# Patient Record
Sex: FEMALE | Race: WHITE | HISPANIC OR LATINO | Employment: FULL TIME | URBAN - METROPOLITAN AREA
[De-identification: names, ages, dates, MRNs, and addresses within clinical notes are randomized per-mention and may not be internally consistent; named-entity substitution may affect disease eponyms.]

---

## 2017-10-04 ENCOUNTER — COMMUNICATION - HEALTHEAST (OUTPATIENT)
Dept: TELEHEALTH | Facility: CLINIC | Age: 32
End: 2017-10-04

## 2017-10-04 ENCOUNTER — OFFICE VISIT - HEALTHEAST (OUTPATIENT)
Dept: FAMILY MEDICINE | Facility: CLINIC | Age: 32
End: 2017-10-04

## 2017-10-04 DIAGNOSIS — Z23 NEED FOR VACCINATION: ICD-10-CM

## 2017-10-04 DIAGNOSIS — Z00.00 ANNUAL PHYSICAL EXAM: ICD-10-CM

## 2017-10-04 DIAGNOSIS — E66.3 OVERWEIGHT (BMI 25.0-29.9): ICD-10-CM

## 2017-10-04 DIAGNOSIS — Z76.89 ESTABLISHING CARE WITH NEW DOCTOR, ENCOUNTER FOR: ICD-10-CM

## 2017-10-04 LAB
CHOLEST SERPL-MCNC: 202 MG/DL
FASTING STATUS PATIENT QL REPORTED: YES
HDLC SERPL-MCNC: 53 MG/DL
LDLC SERPL CALC-MCNC: 118 MG/DL
TRIGL SERPL-MCNC: 157 MG/DL

## 2017-10-04 ASSESSMENT — MIFFLIN-ST. JEOR: SCORE: 1387.61

## 2018-01-05 ENCOUNTER — COMMUNICATION - HEALTHEAST (OUTPATIENT)
Dept: FAMILY MEDICINE | Facility: CLINIC | Age: 33
End: 2018-01-05

## 2018-02-13 ENCOUNTER — AMBULATORY - HEALTHEAST (OUTPATIENT)
Dept: FAMILY MEDICINE | Facility: CLINIC | Age: 33
End: 2018-02-13

## 2018-02-13 ENCOUNTER — COMMUNICATION - HEALTHEAST (OUTPATIENT)
Dept: FAMILY MEDICINE | Facility: CLINIC | Age: 33
End: 2018-02-13

## 2018-02-13 DIAGNOSIS — Z86.19 H/O COLD SORES: ICD-10-CM

## 2018-02-16 ENCOUNTER — OFFICE VISIT - HEALTHEAST (OUTPATIENT)
Dept: FAMILY MEDICINE | Facility: CLINIC | Age: 33
End: 2018-02-16

## 2018-02-16 DIAGNOSIS — R09.81 SINUS CONGESTION: ICD-10-CM

## 2018-02-16 DIAGNOSIS — J32.9 BACTERIAL SINUSITIS: ICD-10-CM

## 2018-02-16 DIAGNOSIS — B96.89 BACTERIAL SINUSITIS: ICD-10-CM

## 2018-02-16 DIAGNOSIS — R68.89 FLU-LIKE SYMPTOMS: ICD-10-CM

## 2018-02-16 LAB
DEPRECATED S PYO AG THROAT QL EIA: NORMAL
FLUAV AG SPEC QL IA: NORMAL
FLUBV AG SPEC QL IA: NORMAL

## 2018-02-16 ASSESSMENT — MIFFLIN-ST. JEOR: SCORE: 1410.29

## 2018-02-17 LAB — GROUP A STREP BY PCR: NORMAL

## 2018-12-21 ENCOUNTER — OFFICE VISIT - HEALTHEAST (OUTPATIENT)
Dept: FAMILY MEDICINE | Facility: CLINIC | Age: 33
End: 2018-12-21

## 2018-12-21 DIAGNOSIS — Z00.00 ANNUAL PHYSICAL EXAM: ICD-10-CM

## 2018-12-21 LAB
ANION GAP SERPL CALCULATED.3IONS-SCNC: 11 MMOL/L (ref 5–18)
BUN SERPL-MCNC: 10 MG/DL (ref 8–22)
CALCIUM SERPL-MCNC: 9.2 MG/DL (ref 8.5–10.5)
CHLORIDE BLD-SCNC: 104 MMOL/L (ref 98–107)
CHOLEST SERPL-MCNC: 218 MG/DL
CO2 SERPL-SCNC: 27 MMOL/L (ref 22–31)
CREAT SERPL-MCNC: 0.65 MG/DL (ref 0.6–1.1)
FASTING STATUS PATIENT QL REPORTED: NO
GFR SERPL CREATININE-BSD FRML MDRD: >60 ML/MIN/1.73M2
GLUCOSE BLD-MCNC: 79 MG/DL (ref 70–125)
HDLC SERPL-MCNC: 59 MG/DL
HGB BLD-MCNC: 14.3 G/DL (ref 12–16)
LDLC SERPL CALC-MCNC: 103 MG/DL
POTASSIUM BLD-SCNC: 4.1 MMOL/L (ref 3.5–5)
SODIUM SERPL-SCNC: 142 MMOL/L (ref 136–145)
TRIGL SERPL-MCNC: 281 MG/DL
TSH SERPL DL<=0.005 MIU/L-ACNC: 0.97 UIU/ML (ref 0.3–5)

## 2018-12-21 ASSESSMENT — MIFFLIN-ST. JEOR: SCORE: 1411.42

## 2018-12-24 LAB
25(OH)D3 SERPL-MCNC: 24.9 NG/ML (ref 30–80)
25(OH)D3 SERPL-MCNC: 24.9 NG/ML (ref 30–80)

## 2019-07-16 ENCOUNTER — OFFICE VISIT - HEALTHEAST (OUTPATIENT)
Dept: FAMILY MEDICINE | Facility: CLINIC | Age: 34
End: 2019-07-16

## 2019-07-16 DIAGNOSIS — S99.929A INJURY OF FOOT: ICD-10-CM

## 2020-01-09 ENCOUNTER — OFFICE VISIT - HEALTHEAST (OUTPATIENT)
Dept: FAMILY MEDICINE | Facility: CLINIC | Age: 35
End: 2020-01-09

## 2020-01-09 DIAGNOSIS — Z00.00 ANNUAL PHYSICAL EXAM: ICD-10-CM

## 2020-01-09 DIAGNOSIS — M25.561 ACUTE PAIN OF BOTH KNEES: ICD-10-CM

## 2020-01-09 DIAGNOSIS — Z78.9 VEGAN DIET: ICD-10-CM

## 2020-01-09 DIAGNOSIS — M25.562 ACUTE PAIN OF BOTH KNEES: ICD-10-CM

## 2020-01-09 DIAGNOSIS — Z23 NEED FOR VACCINATION: ICD-10-CM

## 2020-01-09 LAB
ANION GAP SERPL CALCULATED.3IONS-SCNC: 9 MMOL/L (ref 5–18)
BUN SERPL-MCNC: 9 MG/DL (ref 8–22)
CALCIUM SERPL-MCNC: 8.9 MG/DL (ref 8.5–10.5)
CHLORIDE BLD-SCNC: 107 MMOL/L (ref 98–107)
CHOLEST SERPL-MCNC: 206 MG/DL
CO2 SERPL-SCNC: 25 MMOL/L (ref 22–31)
CREAT SERPL-MCNC: 0.73 MG/DL (ref 0.6–1.1)
FASTING STATUS PATIENT QL REPORTED: NO
GFR SERPL CREATININE-BSD FRML MDRD: >60 ML/MIN/1.73M2
GLUCOSE BLD-MCNC: 74 MG/DL (ref 70–125)
HDLC SERPL-MCNC: 56 MG/DL
HGB BLD-MCNC: 14.9 G/DL (ref 12–16)
HIV 1+2 AB+HIV1 P24 AG SERPL QL IA: NEGATIVE
LDLC SERPL CALC-MCNC: 108 MG/DL
POTASSIUM BLD-SCNC: 4 MMOL/L (ref 3.5–5)
SODIUM SERPL-SCNC: 141 MMOL/L (ref 136–145)
TRIGL SERPL-MCNC: 210 MG/DL
TSH SERPL DL<=0.005 MIU/L-ACNC: 1.11 UIU/ML (ref 0.3–5)
VIT B12 SERPL-MCNC: 505 PG/ML (ref 213–816)

## 2020-01-09 ASSESSMENT — MIFFLIN-ST. JEOR: SCORE: 1451.12

## 2020-01-10 LAB
25(OH)D3 SERPL-MCNC: 19.3 NG/ML (ref 30–80)
25(OH)D3 SERPL-MCNC: 19.3 NG/ML (ref 30–80)

## 2020-08-21 ENCOUNTER — OFFICE VISIT - HEALTHEAST (OUTPATIENT)
Dept: FAMILY MEDICINE | Facility: CLINIC | Age: 35
End: 2020-08-21

## 2020-08-21 DIAGNOSIS — R05.9 COUGH: ICD-10-CM

## 2020-08-21 RX ORDER — CALCIUM CITRATE/VITAMIN D3 200MG-6.25
TABLET ORAL
Status: SHIPPED | COMMUNITY
Start: 2020-08-21

## 2020-08-22 ENCOUNTER — AMBULATORY - HEALTHEAST (OUTPATIENT)
Dept: FAMILY MEDICINE | Facility: CLINIC | Age: 35
End: 2020-08-22

## 2020-08-22 DIAGNOSIS — R05.9 COUGH: ICD-10-CM

## 2020-08-24 ENCOUNTER — COMMUNICATION - HEALTHEAST (OUTPATIENT)
Dept: SCHEDULING | Facility: CLINIC | Age: 35
End: 2020-08-24

## 2021-03-22 ENCOUNTER — OFFICE VISIT - HEALTHEAST (OUTPATIENT)
Dept: FAMILY MEDICINE | Facility: CLINIC | Age: 36
End: 2021-03-22

## 2021-03-22 ENCOUNTER — COMMUNICATION - HEALTHEAST (OUTPATIENT)
Dept: FAMILY MEDICINE | Facility: CLINIC | Age: 36
End: 2021-03-22

## 2021-03-22 DIAGNOSIS — R05.9 COUGH: ICD-10-CM

## 2021-03-22 DIAGNOSIS — R06.2 WHEEZING: ICD-10-CM

## 2021-03-22 DIAGNOSIS — U07.1 CLINICAL DIAGNOSIS OF COVID-19: ICD-10-CM

## 2021-03-26 ENCOUNTER — COMMUNICATION - HEALTHEAST (OUTPATIENT)
Dept: FAMILY MEDICINE | Facility: CLINIC | Age: 36
End: 2021-03-26

## 2021-03-26 ENCOUNTER — AMBULATORY - HEALTHEAST (OUTPATIENT)
Dept: FAMILY MEDICINE | Facility: CLINIC | Age: 36
End: 2021-03-26

## 2021-03-26 DIAGNOSIS — R05.9 COUGH: ICD-10-CM

## 2021-04-08 ENCOUNTER — COMMUNICATION - HEALTHEAST (OUTPATIENT)
Dept: FAMILY MEDICINE | Facility: CLINIC | Age: 36
End: 2021-04-08

## 2021-04-13 ENCOUNTER — COMMUNICATION - HEALTHEAST (OUTPATIENT)
Dept: FAMILY MEDICINE | Facility: CLINIC | Age: 36
End: 2021-04-13

## 2021-04-13 DIAGNOSIS — R06.2 WHEEZING: ICD-10-CM

## 2021-05-10 ENCOUNTER — COMMUNICATION - HEALTHEAST (OUTPATIENT)
Dept: FAMILY MEDICINE | Facility: CLINIC | Age: 36
End: 2021-05-10

## 2021-05-13 ENCOUNTER — OFFICE VISIT - HEALTHEAST (OUTPATIENT)
Dept: FAMILY MEDICINE | Facility: CLINIC | Age: 36
End: 2021-05-13

## 2021-05-13 DIAGNOSIS — M25.561 ACUTE PAIN OF RIGHT KNEE: ICD-10-CM

## 2021-05-13 ASSESSMENT — MIFFLIN-ST. JEOR: SCORE: 1446.58

## 2021-05-27 VITALS
TEMPERATURE: 97.9 F | HEIGHT: 62 IN | RESPIRATION RATE: 18 BRPM | SYSTOLIC BLOOD PRESSURE: 110 MMHG | DIASTOLIC BLOOD PRESSURE: 74 MMHG | WEIGHT: 176 LBS | BODY MASS INDEX: 32.39 KG/M2 | HEART RATE: 92 BPM | OXYGEN SATURATION: 99 %

## 2021-05-31 VITALS — BODY MASS INDEX: 30 KG/M2 | HEIGHT: 62 IN | WEIGHT: 163 LBS

## 2021-06-01 VITALS — HEIGHT: 62 IN | WEIGHT: 168 LBS | BODY MASS INDEX: 30.91 KG/M2

## 2021-06-02 VITALS — WEIGHT: 170 LBS | BODY MASS INDEX: 31.28 KG/M2 | HEIGHT: 62 IN

## 2021-06-03 VITALS — WEIGHT: 171.31 LBS | BODY MASS INDEX: 31.85 KG/M2

## 2021-06-04 VITALS
HEART RATE: 85 BPM | WEIGHT: 177 LBS | SYSTOLIC BLOOD PRESSURE: 110 MMHG | DIASTOLIC BLOOD PRESSURE: 72 MMHG | RESPIRATION RATE: 16 BRPM | TEMPERATURE: 98.1 F | BODY MASS INDEX: 32.57 KG/M2 | HEIGHT: 62 IN | OXYGEN SATURATION: 98 %

## 2021-06-05 NOTE — PROGRESS NOTES
Brooklyn Izquierdo is a 34 y.o. female is here for a  Health Maintenance exam. Medical, family and surgical history reviewed.  Patient works full-time as an .  She does not drink alcohol, she is a non-smoker and denies illicit drug use.  She does work out for 2-4 days a week at [employer].Recently  to wife Marcelle.   She did have a valvuloplasty performed after she was born.    Menstrual cycles are regular, last period December 15.  She typically menstruates every 4-5 weeks, last 4-5 days and are light in flow.  No contraception due to being in a same-sex relationship.  No concerns for STD testing, no reports of normal vaginal discharge.  She has no history of abnormal Pap smears.  Last Pap performed in 2016 was negative for ASCUS or HPV. Current BMI 32.3. Her and her wife are cosuming a vegan diet now due to her wife's gluten intolerance issues. Declined flu shot today.    Bilateral knee pain: She has been experiencing bilateral knee discomfort right greater than left for the last year now.  She has been noticing some creaking and clicking noises specifically on the right side when she exercises at [employer].  She has not noticed any significant swelling or color changes, her leg does not lock on her or give out on her.  She has had no recent falls or trauma.  Her pain is intermittent and only occurs when she is doing any lunging or squatting motions.  She will tend to experience sharp pain with occasional dullness at times.  Running will typically aggravate her knee pain.  Leaving factors include ice packs and resting.  She is rating her knee pain today a 1 out of 10.    1. Annual physical exam  Thyroid Stimulating Hormone (TSH)    Basic Metabolic Panel    Hemoglobin    Vitamin D, Total (25-Hydroxy)    HIV Antigen/Antibody Screening Cascade    Lipid Cascade RANDOM    CANCELED: Lipid Cascade   2. Acute pain of both knees     3. Vegan diet  Vitamin B12   4. Need for vaccination           Healthy  Habits:   Regular Exercise: Yes  Sunscreen Use: Yes  Healthy Diet: Yes  Dental Visits Regularly: Yes  Seat Belt: Yes  Sexually active: Yes  Self Breast Exam Monthly:No, discussed  Hemoccults: No  Flex Sig: No  Colonoscopy: No  Lipid Profile: Yes  Glucose Screen: Yes  Prevention of Osteoporosis: Yes  Last Dexa: No  Guns at Home:  No  Domestic Violence:  No    Current Outpatient Medications Include:  No current outpatient medications on file.    Allergies:    Allergies   Allergen Reactions     Penicillin G Hives     Sulfa (Sulfonamide Antibiotics) Hives       Past Medical History:   Diagnosis Date     Back pain        No past surgical history on file.    OB History   No obstetric history on file.       Immunization History   Administered Date(s) Administered     Influenza, inj, historic,unspecified 11/15/2018     Influenza,seasonal,quad inj =/> 6months 10/04/2017     Tdap 10/04/2018       Family History   Problem Relation Age of Onset     Cancer Mother      Diabetes Mother      No Medical Problems Father      No Medical Problems Sister      No Medical Problems Brother      No Medical Problems Sister      No Medical Problems Sister      No Medical Problems Sister      No Medical Problems Sister      No Medical Problems Brother      No Medical Problems Brother      No Medical Problems Brother      No Medical Problems Brother      No Medical Problems Brother      No Medical Problems Brother        Social History     Socioeconomic History     Marital status: Single     Spouse name: Not on file     Number of children: Not on file     Years of education: 16     Highest education level: Not on file   Occupational History     Occupation:    Social Needs     Financial resource strain: Not on file     Food insecurity:     Worry: Not on file     Inability: Not on file     Transportation needs:     Medical: Not on file     Non-medical: Not on file   Tobacco Use     Smoking status: Never Smoker     Smokeless  tobacco: Never Used   Substance and Sexual Activity     Alcohol use: No     Drug use: No     Sexual activity: Yes     Partners: Female   Lifestyle     Physical activity:     Days per week: Not on file     Minutes per session: Not on file     Stress: Not on file   Relationships     Social connections:     Talks on phone: Not on file     Gets together: Not on file     Attends Sabianism service: Not on file     Active member of club or organization: Not on file     Attends meetings of clubs or organizations: Not on file     Relationship status: Not on file     Intimate partner violence:     Fear of current or ex partner: Not on file     Emotionally abused: Not on file     Physically abused: Not on file     Forced sexual activity: Not on file   Other Topics Concern     Not on file   Social History Narrative     Not on file       Last cholesterol:   Lab Results   Component Value Date    CHOL 218 (H) 12/21/2018    CHOL 202 (H) 10/04/2017     Lab Results   Component Value Date    HDL 59 12/21/2018    HDL 53 10/04/2017     Lab Results   Component Value Date    LDLCALC 103 12/21/2018    LDLCALC 118 10/04/2017     Lab Results   Component Value Date    TRIG 281 (H) 12/21/2018    TRIG 157 (H) 10/04/2017     No components found for: CHOLHDL    MAMMO: na      Birth Control Method:  None, same sex marriage  High Risk/Behavior:       LMP: Patient's last menstrual period was 12/15/2019 (approximate).  Menstrual Regularity: regular  Flow: moderate, 4 days      Review of Systems:   General:  Denies fever, chills, HA, fatigue, myalgias, weight change    Eyes: Denies vision changes   Ears/Nose/Throat: Denies nasal congestion, rhinorrhea, ear pain or discharge, sore throat, swollen glands  Cardiovascular: Denies CP, palpitations  Respiratory:  Denies SOB, cough  Gastrointestinal:  Denies changes in bowel habits, melena, rectal bleeding,  Genitourinary: Denies changes in urine habits/frequency/dysuria, hematuria   Musculoskeletal:   "  Skin: Denies rashes   Neurologic: Denies weakness, paresthesia  Psychiatric: Denies mood changes   Endocrine: Denies polyuria, polydipsia, polyphagia  Heme/Lymphatic: Denies problem with bleeding   Allergic/Immunologic: Denies problem     POSITIVES: bilateral knee pain      PHYSICAL EXAM:    /72   Pulse 85   Temp 98.1  F (36.7  C)   Resp 16   Ht 5' 2\" (1.575 m)   Wt 177 lb (80.3 kg)   LMP 12/15/2019 (Approximate)   SpO2 98%   Breastfeeding No   BMI 32.37 kg/m      Wt Readings from Last 3 Encounters:   01/09/20 177 lb (80.3 kg)   07/16/19 171 lb 5 oz (77.7 kg)   12/21/18 170 lb (77.1 kg)       Body mass index is 32.37 kg/m .    Well developed, well nourished, no acute distress.  HEENT: normocephalic/atraumatic  EYES:PERRLA/EOMI, no redness, swelling or drainage  EARS: TMs: Gray, normal light reflex   NOSE:  no nasal discharge.    MOUTH: Oral mucosa: no erythema/exudate  Neck: No LAD/masses/thyromegaly/bruits  Lungs: clear bilaterally  Heart: regular rate and rhythm, no murmurs/gallops/rubs, no LE swelling, Bilateral femoral, pedal and post-tibial pulses are equal and palpable, 2+  Breasts: symmetric, no masses/skin changes, nipple discharge, or axillary LAD.  BSE reviewed.  Abdomen: Normal bowel sounds, soft, non-tender, non-distended, no masses, neg Reynolds's/McBurney's, no rebound/guarding  Genital: Normal external genitalia, no discharge, no lesions, pelvic exam is negative for masses, bleeding or tenderness.   Rectal: internal exam is deferred.  External exam is normal.  Lymphatics: no supraclavicular/axillary/epitrochlear/inguinal LAD. No edema.  Neuro: A&O x 3, CN II-XII intact, strength 5/5, reflexes symmetric, sensory intact to light touch.  Psych: Behavior appropriate, engaging.  Thought processes congruent, non-tangential.  Musculoskeletal: no gross deformities, knees with full ROM, no pain with flexion or extension, no swelling, crepitus noted with right knee with flexion and " extension  Skin: no rashes or lesions, no atypical moles, pink, warm and dry      No results found for this or any previous visit (from the past 240 hour(s)).    ASSESSMENT/PLAN: 34 y.o. female physical exam       The following high BMI interventions were performed this visit: encouragement to exercise and weight monitoring    1. Annual physical exam    - Thyroid Stimulating Hormone (TSH)  - Basic Metabolic Panel  - Hemoglobin  - Vitamin D, Total (25-Hydroxy)  - HIV Antigen/Antibody Screening Cascade  - Lipid Cascade RANDOM  -discussed supplementing with 500 mcg daily of vitamin B12 due to vegan diet  -continue monthly BSE  Routine health maintenance discussion:  No smoking, limited alcohol (7 or less servings per week), 5 fruits/veg servings per day, 200 minutes of exercise per week.  Daily calcium/vitamin D guidelines, bone health, yearly mammogram after age 39/regular pap smears/colon cancer screening beginning at age 50.  Accident avoidance, sun screen.      2. Acute pain of both knees    -continue with regular exercise but modify if knee pain occurs  -discussed use of Chondrotin for joints  -Tylenol and ice pack PRN    3. Vegan diet    - Vitamin B12  -discussed supplementing with 500 mcg daily of vitamin B12 due to vegan diet      4. Need for vaccination    -declined flu vaccine today      There are no discontinued medications.    Follow up in one year for next physical      Will contact her with the results of the labs when available.    Sandie Ireland , CNP

## 2021-06-10 NOTE — PROGRESS NOTES
"Brooklyn Izquierdo is a 35 y.o. female who is being evaluated via a billable telephone visit.      The patient has been notified of following:     \"This telephone visit will be conducted via a call between you and your physician/provider. We have found that certain health care needs can be provided without the need for a physical exam.  This service lets us provide the care you need with a short phone conversation.  If a prescription is necessary we can send it directly to your pharmacy.  If lab work is needed we can place an order for that and you can then stop by our lab to have the test done at a later time.    Telephone visits are billed at different rates depending on your insurance coverage. During this emergency period, for some insurers they may be billed the same as an in-person visit.  Please reach out to your insurance provider with any questions.    If during the course of the call the physician/provider feels a telephone visit is not appropriate, you will not be charged for this service.\"    Patient has given verbal consent to a Telephone visit? Yes    What phone number would you like to be contacted at? [phone].    Patient would like to receive their AVS by AVS Preference: Toshia.    Additional provider notes: Patient presents today with illness concerns.  This started 3 days ago.  Initially started as a sore throat, now has persistent cough keeping her up at night.  No known sick exposure.  Afebrile without chills.  No body aches.  No shortness of breath or chest pain.  Does work as an .  Her wife is expected to undergo surgery in a couple weeks which she is anxious about since she has to be healthy in order to go forward with surgery.    Assessment/Plan:  1. Cough    Likely viral illness.  No red flags in which she is telling me.  Tessalon Perles sent to the pharmacy.  Should improve over the next several days.  Given cough in the time of COVID we discussed testing which she is open " to.    - benzonatate (TESSALON PERLES) 100 MG capsule; Take 1 capsule (100 mg total) by mouth 3 (three) times a day as needed for cough.  Dispense: 30 capsule; Refill: 0  - Symptomatic COVID-19 Virus (CORONAVIRUS) PCR; Future        Phone call duration:  13 minutes

## 2021-06-13 NOTE — PROGRESS NOTES
Brooklyn Beatty is a 32 y.o. female is here for a  Health Maintenance exam.  Medical, family and surgical history reviewed with patient today along with current medication list.  Patient works full-time as a petition and is currently in a same-sex relationship with her life partner Marcelle.  Patient does not drink alcohol, smoke cigarettes and denies illicit drug use.  She has recently lost over 40 pounds by modifying her diet and performs yoga and running 4-5 days a week.  She recently had her vision tested and was found that her prescription for her glasses and contacts did change.  She is current with her dental cleanings.  She did inform me that after she was born she did have a valvuloplasty completed.  She is intolerant to dairy products.  She did fall shoveling snow last winter and has been experiencing ongoing back pain intermittently since this occurred.  She does see a chiropractor weekly which helps manage her back pain. Influenza vaccination administered today.    1. Establishing care with new doctor, encounter for     2. Annual physical exam  Hemoglobin    Lipid Cascade FASTING    Thyroid Stimulating Hormone (TSH)    Vitamin D, Total (25-Hydroxy)   3. Need for vaccination  Influenza, Seasonal,Quad Inj, 36+ MOS   4. Overweight (BMI 25.0-29.9)           Healthy Habits:   Regular Exercise: Yes , , running  Sunscreen Use: Yes  Healthy Diet: Yes, lean meats, yogurt, fruit, veggies, protein shakes  Dental Visits Regularly: Yes  Seat Belt: Yes  Sexually active: Yes  Self Breast Exam Monthly:No, discussed (Mother with breast cancer history)  Hemoccults: No  Flex Sig: No  Colonoscopy: No  Lipid Profile: Yes  Glucose Screen: Yes  Prevention of Osteoporosis: Yes  Last Dexa: No  Guns at Home:  No  Domestic Violence:  No    Current Outpatient Medications Include:  No current outpatient prescriptions on file.    Allergies:    Allergies   Allergen Reactions     Penicillin G Hives     Sulfa (Sulfonamide  Antibiotics) Hives       Past Medical History:   Diagnosis Date     Back pain        History reviewed. No pertinent surgical history.    OB History   No data available       Immunization History   Administered Date(s) Administered     Influenza, seasonal,quad inj 36+ mos 10/04/2017       Family History   Problem Relation Age of Onset     Cancer Mother      Diabetes Mother      No Medical Problems Father      No Medical Problems Sister      No Medical Problems Brother      No Medical Problems Sister      No Medical Problems Sister      No Medical Problems Sister      No Medical Problems Sister      No Medical Problems Brother      No Medical Problems Brother      No Medical Problems Brother      No Medical Problems Brother      No Medical Problems Brother      No Medical Problems Brother        Social History     Social History     Marital status: Single     Spouse name: N/A     Number of children: N/A     Years of education: 16     Occupational History           Social History Main Topics     Smoking status: Never Smoker     Smokeless tobacco: Never Used     Alcohol use No     Drug use: No     Sexual activity: Yes     Partners: Female     Other Topics Concern     Not on file     Social History Narrative     No narrative on file       Last cholesterol:   Lab Results   Component Value Date    CHOL 202 (H) 10/04/2017     Lab Results   Component Value Date    HDL 53 10/04/2017     Lab Results   Component Value Date    LDLCALC 118 10/04/2017     Lab Results   Component Value Date    TRIG 157 (H) 10/04/2017     No components found for: CHOLHDL    MAMMO: N/A      Birth Control Method: None  High Risk/Behavior: No, same sex relationship, partner: Albina      LMP: Patient's last menstrual period was 09/29/2017 (exact date).  Menstrual Regularity: Every 4 to 5 weeks  Flow: light, lasts 5 to 7 days      Review of Systems:   General:  Denies fever, chills, HA, fatigue, myalgias, weight change    Eyes: Denies  "vision changes   Ears/Nose/Throat: Denies nasal congestion, rhinorrhea, ear pain or discharge, sore throat, swollen glands  Cardiovascular: Denies CP, palpitations  Respiratory:  Denies SOB, cough  Gastrointestinal:  Denies changes in bowel habits, melena, rectal bleeding,  Genitourinary: Denies changes in urine habits/frequency/dysuria, hematuria   Musculoskeletal:  Denies swelling or erythema, edema  Skin: Denies rashes   Neurologic: Denies weakness, paresthesia  Psychiatric: Denies mood changes   Endocrine: Denies polyuria, polydipsia, polyphagia  Heme/Lymphatic: Denies problem with bleeding   Allergic/Immunologic: Denies problem     POSITIVES: dry skin, low back pain, plugged ears, heart murmur since birth, lactose intolerant      PHYSICAL EXAM:    /72  Pulse 70  Temp 97.9  F (36.6  C)  Resp 14  Ht 5' 2\" (1.575 m)  Wt 163 lb (73.9 kg)  LMP 09/29/2017 (Exact Date)  Breastfeeding? No  BMI 29.81 kg/m2    Wt Readings from Last 3 Encounters:   10/04/17 163 lb (73.9 kg)       Body mass index is 29.81 kg/(m^2).    Well developed, well nourished, no acute distress.  HEENT: normocephalic/atraumatic, PERRLA/EOMI, TMs: Gray, normal light reflex, no nasal discharge.  Oral mucosa: no erythema/exudate  Neck: No LAD/masses/thyromegaly/bruits  Lungs: clear bilaterally  Heart: regular rate and rhythm, grade 2/6 systolic murmur, no gallops/rubs  Breasts: symmetric, no masses/skin changes, nipple discharge, or axillary LAD.  BSE reviewed.  Abdomen: Normal bowel sounds, soft, non-tender, non-distended, no masses, neg Reynolds's/McBurney's, no rebound/guarding  Genital: Normal external genitalia, no discharge, no lesions. Last pap November 2016: normal. Pelvic exam normal, no mass or abnormal bleeding noted.  Rectal: internal exam is deferred.  External exam is normal.  Lymphatics: no supraclavicular/axillary/epitrochlear/inguinal LAD. No edema.  Neuro: A&O x 3, CN II-XII intact, strength 5/5, reflexes symmetric, sensory " intact to light touch.  Psych: Behavior appropriate, engaging.  Thought processes congruent, non-tangential.  Musculoskeletal: no gross deformities.  Skin: no rashes or lesions.      Recent Results (from the past 240 hour(s))   Hemoglobin   Result Value Ref Range    Hemoglobin 15.0 12.0 - 16.0 g/dL   Lipid Cascade FASTING   Result Value Ref Range    Cholesterol 202 (H) <=199 mg/dL    Triglycerides 157 (H) <=149 mg/dL    HDL Cholesterol 53 >=50 mg/dL    LDL Calculated 118 <=129 mg/dL    Patient Fasting > 8hrs? Yes    Thyroid Stimulating Hormone (TSH)   Result Value Ref Range    TSH 0.76 0.30 - 5.00 uIU/mL   Vitamin D, Total (25-Hydroxy)   Result Value Ref Range    Vitamin D, Total (25-Hydroxy) 40.4 30.0 - 80.0 ng/mL       ASSESSMENT/PLAN: 32 y.o. female physical exam and pelvic exam      The following high BMI interventions were performed this visit: exercise promotion: strength training    1. Establishing care with new doctor, encounter for    -get to know you visit  -MIKI form completed    2. Annual physical exam    - Hemoglobin  - Lipid Cascade FASTING  - Thyroid Stimulating Hormone (TSH)  - Vitamin D, Total (25-Hydroxy)    3. Need for vaccination    - Influenza, Seasonal,Quad Inj, 36+ MOS    4. Overweight (BMI 25.0-29.9)    -recent weight loss of 50 pounds    There are no discontinued medications.    Routine health maintenance discussion:  No smoking, limited alcohol (7 or less servings per week), 5 fruits/veg servings per day, 200 minutes of exercise per week.  Daily calcium/vitamin D guidelines, bone health, yearly mammogram after age 39/regular pap smears/colon cancer screening beginning at age 50.  Accident avoidance, sun screen.      Will contact her with the results of the labs when available.    40 minutes spent together with the patient today, more than 50% spent in counseling, discussing the above topics.    Sandie Ireland , CNP

## 2021-06-16 NOTE — TELEPHONE ENCOUNTER
Refill Approved    Rx renewed per Medication Renewal Policy. Medication was last renewed on 3/22/21.    Hossein Ramsey, [facility] Connection Triage/Med Refill 4/14/2021     Requested Prescriptions   Pending Prescriptions Disp Refills     albuterol (PROAIR HFA;PROVENTIL HFA;VENTOLIN HFA) 90 mcg/actuation inhaler 1 each 0     Sig: Inhale 2 puffs every 6 (six) hours as needed for wheezing.       Albuterol/Levalbuterol Refill Protocol Passed - 4/13/2021  1:36 PM        Passed - PCP or prescribing provider visit in last year     Last office visit with prescriber/PCP: 2/16/2018 Sandie Ireland NP OR same dept: Visit date not found OR same specialty: 7/16/2019 Derrick Momin MD Last physical: 1/9/2020       Next appt within 3 mo: Visit date not found  Next physical within 3 mo: Visit date not found  Prescriber OR PCP: Sandie Ireland NP  Last diagnosis associated with med order: 1. Wheezing  - albuterol (PROAIR HFA;PROVENTIL HFA;VENTOLIN HFA) 90 mcg/actuation inhaler; Inhale 2 puffs every 6 (six) hours as needed for wheezing.  Dispense: 1 each; Refill: 0    If protocol passes may refill for 6 months if within 3 months of last provider visit (or a total of 9 months). If patient requesting >1 inhaler per month refill x 6 months and have patient make appointment with provider.

## 2021-06-16 NOTE — PROGRESS NOTES
Brooklyn Izquierdo is a 35 y.o. female who is being evaluated via a billable video visit.      How would you like to obtain your AVS? MyChart.  If dropped from the video visit, the video invitation should be resent by: Text to cell phone: [phone]   Will anyone else be joining your video visit? No      Video Start Time: 1648    Assessment & Plan     Clinical diagnosis of COVID-19    Confirmed positive 3/22/21 by swab  Symptom onset 3/18/21, she will be out of quarantine on 4/2/21    Cough    - guaiFENesin (ROBITUSSIN) 100 mg/5 mL syrup  Dispense: 473 mL; Refill: 0    Wheezing    - albuterol (PROAIR HFA;PROVENTIL HFA;VENTOLIN HFA) 90 mcg/actuation inhaler  Dispense: 1 each; Refill: 0        12 minutes spent on the date of the encounter doing chart review, patient visit and documentation        Return in about 1 week (around 3/29/2021), or if symptoms worsen or fail to improve, for cough virtual only due to positive COVID. Out of quarantine 4/2/21.    Sandie Ireland NP  [facility]   Brooklyn Izquierdo is 35 y.o. and presents today for the following health issues: cough, positive COVID  HPI patient tested positive for Covid 1 day ago.  Her symptoms started 4 days prior to this.  Symptoms started with a scratchy throat, headache and dry cough.  She did have fevers of 99.92 days ago, she has been afebrile ever since.  She does note some mild fatigue today.  She continues to report a dry cough that is persistent, she does feel that she wheezes at times and her chest feels a bit tight.  Cough is worse during the daytime, she is able to take a deep breath without difficulty and does not feel short of breath.  She is able to taste and smell, she denies any rashes.  Appetite remains intact, she does report some mild chills and body aches.  She is using Tylenol for aches and pains.  She will be on quarantine up until April 2.        Review of Systems        Objective    Vitals - Patient  Reported  Weight (Patient Reported): 175 lb (79.4 kg)    Physical Exam      Review of Systems     Denies fever, chills, visual changes,  nasal congestion, rhinorrhea, ear pain, or discharge,  swollen glands, breast mass, nipple discharge, breast changes, abdominal pain, shortness of breath, chest pain, weight change, change in bowel habits, melena, rectal bleeding, dysuria, frequency, urgency, hematuria, polyuria, polydipsia, polyphagia, joint pain or swelling or erythema, edema, rash, weakness, paresthesias, vaginal discharge or bleeding or mood changes.       Objective:         There were no vitals taken for this visit.     Physical Exam:  General Appearance: Alert, cooperative, no distress, appears stated age  Head: Normocephalic, without obvious abnormality, atraumatic  Eyes: conjunctiva/corneas clear  Nose:  no drainage  Lungs: No cough noted during visit today, respirations unlabored  Skin: Skin color normal, no rashes or lesions                       Video-Visit Details    Type of service:  Video Visit    Video End Time (time video stopped): 4:57 PM  Originating Location (pt. Location): Home    Distant Location (provider location):  [facility]     Platform used for Video Visit: [place]

## 2021-06-16 NOTE — PROGRESS NOTES
1. Flu-like symptoms  Influenza A/B Rapid Test    Rapid Strep A Screen-Throat   2. Sinus congestion     3. Bacterial sinusitis  doxycycline (VIBRA-TABS) 100 MG tablet     Med list reconciled    ASSESSMENT/PLAN:     The following high BMI interventions were performed this visit: encouragement to exercise and weight monitoring    1. Flu-like symptoms    - Influenza A/B Rapid Test  - Rapid Strep A Screen-Throat    2. Sinus congestion      3. Bacterial sinusitis    - doxycycline (VIBRA-TABS) 100 MG tablet; Take 1 tablet (100 mg total) by mouth 2 (two) times a day for 10 days.  Dispense: 20 tablet; Refill: 0    Return to clinic if symptoms persist or become severe in the next 7-10 days despite antibiotic therapy.  Will notify patient of rapid strep and influenza results once they are available.    The visit lasted a total of 25 minutes face to face with the patient.  Over 50% of the time spent counseling and educating the patient about above content.      Sandie Ireland NP          SUBJECTIVE:  Brooklyn Beatty is a 32 y.o. female who presents with 5 days of nasal congestion, sinus pain, increase postnasal drip, cough and right ear pain.  She states her symptoms have been constant and describes it as a dull, achy sensation.  Aggravating factors: Laying flat, she has been sleeping sitting up.  Relieving factors: Mucinex.  She is rating her facial and head pain a 3 out of 10 today.  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  No history of diabetes or asthma.  Vitals are stable today, she is afebrile.  Strep and influenza testing performed today.  Chief Complaint   Patient presents with     Illness     x 5 days - Head congestion, productive cough/green mucus, sorethroat, drainage         There is no problem list on file for this patient.      Current Outpatient Prescriptions   Medication Sig Dispense Refill      doxycycline (VIBRA-TABS) 100 MG tablet Take 1 tablet (100 mg total) by mouth 2 (two) times a day for 10 days. 20 tablet 0     No current facility-administered medications for this visit.        History   Smoking Status     Never Smoker   Smokeless Tobacco     Never Used       REVIEW OF SYSTEMS: Denies fever/chills/swollen glands/shortness of breath/discomfort of chest/abdominal pain/changes in bowel habits/urinary symptoms/rash.      TOBACCO USE:  History   Smoking Status     Never Smoker   Smokeless Tobacco     Never Used     Social History     Social History     Marital status: Single     Spouse name: N/A     Number of children: N/A     Years of education: 16     Occupational History           Social History Main Topics     Smoking status: Never Smoker     Smokeless tobacco: Never Used     Alcohol use No     Drug use: No     Sexual activity: Yes     Partners: Female     Other Topics Concern     Not on file     Social History Narrative         OBJECTIVE:            Vitals:    02/16/18 0852   BP: 126/68   Pulse: 74   Resp: 16   Temp: 98  F (36.7  C)   SpO2: 98%     Weight: 168 lb (76.2 kg)  Wt Readings from Last 3 Encounters:   02/16/18 168 lb (76.2 kg)   10/04/17 163 lb (73.9 kg)     Body mass index is 30.73 kg/(m^2).        Physical Exam:  GENERAL APPEARANCE: A&A, NAD, well hydrated, well nourished  HEAD: atraumatic, no deformity, bilateral frontal and maxillary sinus pain with palpation  EYES: PERRL, EOM's intact, no redness or swelling  EARS: Bilateral TMs with moderate erythema and moderate bulging, no perforation or fluid noted  NOSE: Right nare filled with thick, yellow-green mucus, left nare filled with thick, clear drainage  MOUTH: moderate erythema, no exudate or thrush, jaw alignment with opening and closing of mouth, no clicking noted  NECK: Supple, without lymphadenopathy, no thyroid mass, no JVD, no bruit  CV: RRR, no M/G/R   LUNGS: CTAB, normal respiratory effort  ABDOMEN: S&NT, no  masses, no organomegaly, BS present x4   SKIN:  Normal skin turgor, no lesions/rashes, warm and dry

## 2021-06-17 NOTE — PROGRESS NOTES
"1. Acute pain of right knee  XR Knee Right 1 or 2 VWS         Assessment & Plan     Acute pain of right knee    - XR Knee Right 1 or 2 VWS  Patient instructed to rest, ice the affected area several times per day for 10 minutes at a time, may use compression wrap to the area if pain and swelling occur and elevated the affected area whenever patient is able. May take Tylenol 1000 mg four times per day or Ibuprofen 800 mg three times daily for pain and inflammation.   Will discuss plan of care once xray results reviewed  Activity as tolerated        12 minutes spent on the date of the encounter doing chart review, review of test results, interpretation of tests, patient visit and documentation        BMI:   Estimated body mass index is 32.19 kg/m  as calculated from the following:    Height as of this encounter: 5' 2\" (1.575 m).    Weight as of this encounter: 176 lb (79.8 kg).   The following high BMI interventions were performed this visit: encouragement to exercise and exercise promotion: stretching    Return in about 2 weeks (around 5/27/2021), or if symptoms worsen or fail to improve, for knee pain.    Sandie Ireland NP  [facility]   Brooklyn Izquierdo is 35 y.o. and presents today for the following health issues: knee pain   HPI patient presents today reporting with bilateral knee pain, right and left.  Initial pain started 2 years ago but over the last 2 months, she has noticed increased pain in the right knee.  She has never been evaluated by orthopedic specialty.  She describes her pain as an intermittent sharp sensation that is worse when she goes up and down stairs, performs regular and side lunges when she is working out and anytime she has to abruptly turn.  She does note some popping and grinding noises in the knee.  Her knee is not feeling unstable, the knee has not locked up on her.  She has not noticed any significant swelling or color changes, she denies any " "recent numbness or tingling in the extremity.  She occasionally ices the knee and takes Tylenol when needed for pain control.  Today she is rating her pain a 0-10.          Review of Systems        Objective    /74   Pulse 92   Temp 97.9  F (36.6  C)   Resp 18   Ht 5' 2\" (1.575 m)   Wt 176 lb (79.8 kg)   SpO2 99%   Breastfeeding No   BMI 32.19 kg/m    Body mass index is 32.19 kg/m .  Physical Exam      Review of Systems     Denies fever, chills, visual changes, fatigue, myalgias, nasal congestion, rhinorrhea, ear pain, or discharge, sore throat, swollen glands, breast mass, nipple discharge, breast changes, abdominal pain, cough, shortness of breath, chest pain, weight change, change in bowel habits, melena, rectal bleeding, dysuria, frequency, urgency, hematuria, polyuria, polydipsia, polyphagia,  erythema, edema, rash, weakness, paresthesias, vaginal discharge or bleeding or mood changes.       Objective:         /74   Pulse 92   Temp 97.9  F (36.6  C)   Resp 18   Ht 5' 2\" (1.575 m)   Wt 176 lb (79.8 kg)   SpO2 99%   Breastfeeding No   BMI 32.19 kg/m       Physical Exam:  General Appearance: Alert, cooperative, no distress, appears stated age  Lungs: Clear to auscultation bilaterally, respirations unlabored  Heart: Regular rate and rhythm, S1 and S2 normal, no murmur, rub, or gallop  Extremities: Extremities normal, atraumatic, no cyanosis or edema. Moderate amount of crepitus noted in right knee with extension and flexion. She does note increased pain with direct palpation on the lateral portion of the knee cap bilaterally.   Skin: Skin color, texture, turgor normal, no rashes or lesions                         "

## 2021-06-18 NOTE — PATIENT INSTRUCTIONS - HE
Patient Instructions by Sandie Ireland NP at 5/13/2021 11:20 AM     Author: Sandie Ireland NP Service: -- Author Type: Nurse Practitioner    Filed: 5/13/2021 11:23 AM Encounter Date: 5/13/2021 Status: Signed    : Sandie Ireland NP (Nurse Practitioner)       Patient Education     Knee Pain  Knee pain is very common. Its especially common in active people who put a lot of pressure on their knees, like runners. It affects women more often than men.  Your kneecap (patella) is a thick, round bone. It covers and protects the front portion of your knee joint. It moves along a groove in your thighbone (femur) as part of the patellofemoral joint. A layer of cartilage surrounds the underside of your kneecap. This layer protects it from grinding against your femur.  When this cartilage softens and breaks down, it can cause knee pain. This is partly because of repetitive stress. The stress irritates the lining of the joint. This causes pain in the underlying bone.  What causes knee pain?  Many things can cause knee pain. You may have more than one cause. Some of these include:    Overuse of the knee joint    The kneecap doesnt line up with the tissue around it    Damage to small nerves in the area    Damage to the ligament-like structure that holds the kneecap in place (retinaculum)    Breakdown of the bone under the cartilage    Swelling in the soft tissues around the kneecap    Injury  You might be more likely to have knee pain if you:    Exercise a lot    Recently increased the intensity of your workouts    Have a body mass index (BMI) greater than 25    Have poor alignment of your kneecap    Walk with your feet turned overly outward or inward    Have weakness in surrounding muscle groups (inner quad or hip adductor muscles)    Have too much tightness in surrounding muscle groups (hamstrings or iliotibial band)    Have a recent history of injury to the area    Are female  Symptoms of knee pain  This  type of knee pain is a dull, aching pain in the front of the knee in the area under and around the kneecap. This pain may start quickly or slowly. Your pain might be worse when you squat, run, or sit for a long time. Climbing stairs may be painful or hard to do. You might also sometimes feel like your knee is giving out. You may have symptoms in one or both of your knees.  Diagnosing knee pain  Your healthcare provider will ask about your medical history and your symptoms. Be sure to describe any activities that make your knee pain worse. He or she will look at your knee. This will include tests of your range of motion, strength, and areas of pain of your knee. Your knee alignment will be checked.  Your healthcare provider will need to rule out other causes of your knee pain, such as arthritis. You may need an imaging test, such as an X-ray or MRI.  Treatment for knee pain  Treatments that can help ease your symptoms may include:    Avoiding activities for a while that make your pain worse, returning to activity over time    Icing the outside of your knee when it causes you pain    Taking over-the-counter pain medicine such as NSAIDs    Wearing a knee brace or taping your knee to support it    Compression to help prevent swelling    Wearing special shoe inserts to help keep your feet in the proper alignment    Elevating your knee    Doing special exercises to stretch and strengthen the muscles around your hip and your knee  These steps help most people manage knee pain. But some cases of knee pain need to be treated with surgery. You rarely need surgery right away. You may need it later if other treatments dont work. Your healthcare provider may refer you to an orthopedic surgeon. He or she will talk with you about your choices.  Preventing knee pain  Losing weight and correcting excess muscle tightness or muscle weakness may help lower your risk.  In some cases, you can prevent knee pain. To help prevent a flare-up  of knee pain, do these things:    Regularly do all the exercises your doctor or physical therapist advises    Warm up fully before exercising    Support your knee as advised by your doctor or physical therapist    Increase training gradually, and ease up on training when needed    Have an expert check your gait for running or other sporting activities    Stretch properly before and after exercise    Replace your running shoes regularly    Lose excess weight  When to call your healthcare provider  Call your healthcare provider right away if:    Your symptoms dont get better after a few weeks of treatment    You have any new symptoms  Date Last Reviewed: 6/1/2019 2000-2019 [employer]. [address] [place] [id number]. All rights reserved. This information is not intended as a substitute for professional medical care. Always follow your healthcare professional's instructions.

## 2021-06-22 NOTE — PROGRESS NOTES
Brooklyn Beatty is a 33 y.o. female is here for a  Health Maintenance exam.  Medical, family and surgical history reviewed.  Patient works full-time as an .  She does not drink alcohol, she is a non-smoker and denies illicit drug use.  She does work out for 2-4 days a week at [employer].  She is working on losing another 20 pounds before her wedding in October 2019.  She is engaged to her partner Marcelle.  They have engaged for 1 year.  Occasional dry skin with dry cough, frequent nasal discharge and occasional sore throats.  She did have a valvuloplasty performed after she was born.   She denies any recent fevers, chills, nausea vomiting, no body aches.  Menstrual cycles are regular, last period December 17.  She typically menstruates every 4-5 weeks, last 4-5 days and are light in flow.  No contraception due to being in a same-sex relationship.  No concerns for STD testing, no reports of normal vaginal discharge.  She has no history of abnormal Pap smears.  Last Pap performed in 2016 was negative for ASCUS or HPV.  Vaccinations are up-to-date, she received her flu shot and Tdap at  [facility].  Her vitals are stable, weight is stable from last years physical.  BMI 31.6.      1. Annual physical exam  Thyroid Stimulating Hormone (TSH)    Basic Metabolic Panel    Hemoglobin    Vitamin D, Total (25-Hydroxy)    Lipid Cascade RANDOM    CANCELED: Lipid Cascade         Healthy Habits:   Regular Exercise: Yes  Sunscreen Use: Yes  Healthy Diet: Yes  Dental Visits Regularly: Yes  Seat Belt: Yes  Sexually active: Yes  Self Breast Exam Monthly:Yes  Hemoccults: No  Flex Sig: No  Colonoscopy: No  Lipid Profile: Yes  Glucose Screen: Yes  Prevention of Osteoporosis: Yes  Last Dexa: No  Guns at Home:  No  Domestic Violence:  No    Current Outpatient Medications Include:  No current outpatient medications on file.    Allergies:    Allergies   Allergen Reactions     Penicillin G Hives     Sulfa (Sulfonamide  Antibiotics) Hives       Past Medical History:   Diagnosis Date     Back pain        History reviewed. No pertinent surgical history.    OB History   No data available       Immunization History   Administered Date(s) Administered     Influenza, inj, historic,unspecified 11/15/2018     Influenza, seasonal,quad inj 36+ mos 10/04/2017       Family History   Problem Relation Age of Onset     Cancer Mother      Diabetes Mother      No Medical Problems Father      No Medical Problems Sister      No Medical Problems Brother      No Medical Problems Sister      No Medical Problems Sister      No Medical Problems Sister      No Medical Problems Sister      No Medical Problems Brother      No Medical Problems Brother      No Medical Problems Brother      No Medical Problems Brother      No Medical Problems Brother      No Medical Problems Brother        Social History     Socioeconomic History     Marital status: Single     Spouse name: Not on file     Number of children: Not on file     Years of education: 16     Highest education level: Not on file   Social Needs     Financial resource strain: Not on file     Food insecurity - worry: Not on file     Food insecurity - inability: Not on file     Transportation needs - medical: Not on file     Transportation needs - non-medical: Not on file   Occupational History     Occupation:    Tobacco Use     Smoking status: Never Smoker     Smokeless tobacco: Never Used   Substance and Sexual Activity     Alcohol use: No     Drug use: No     Sexual activity: Yes     Partners: Female   Other Topics Concern     Not on file   Social History Narrative     Not on file       Last cholesterol:   Lab Results   Component Value Date    CHOL 202 (H) 10/04/2017     Lab Results   Component Value Date    HDL 53 10/04/2017     Lab Results   Component Value Date    LDLCALC 118 10/04/2017     Lab Results   Component Value Date    TRIG 157 (H) 10/04/2017     No components found for:  "CHOLHDL    MAMMO: N/A      Birth Control Method: None, same sex relationship  High Risk/Behavior: No      LMP: Patient's last menstrual period was 12/17/2018 (approximate).  Menstrual Regularity:  Every 4 to 5 weeks  Flow: 4 to 5 days, light flow    Review of Systems:   General:  Denies fever, chills, HA, fatigue, myalgias, weight change    Eyes: Denies vision changes   Ears/Nose/Throat: Denies  rhinorrhea, ear pain or discharge, sore throat, swollen glands  Cardiovascular: Denies CP, palpitations  Respiratory:  Denies SOB  Gastrointestinal:  Denies changes in bowel habits, melena, rectal bleeding,  Genitourinary: Denies changes in urine habits/frequency/dysuria, hematuria   Musculoskeletal:  Denies  joint pain or swelling or erythema, edema  Skin: Denies rashes   Neurologic: Denies weakness, paresthesia  Psychiatric: Denies mood changes   Endocrine: Denies polyuria, polydipsia, polyphagia  Heme/Lymphatic: Denies problem with bleeding   Allergic/Immunologic: Denies problem     POSITIVES: dry skin and cough, sinus drainage, sore throat intermittent      PHYSICAL EXAM:    /70   Pulse 71   Temp 98.5  F (36.9  C)   Resp 14   Ht 5' 1.5\" (1.562 m)   Wt 170 lb (77.1 kg)   LMP 12/17/2018 (Approximate)   SpO2 99%   Breastfeeding? No   BMI 31.60 kg/m      Wt Readings from Last 3 Encounters:   12/21/18 170 lb (77.1 kg)   02/16/18 168 lb (76.2 kg)   10/04/17 163 lb (73.9 kg)       Body mass index is 31.6 kg/m .    Well developed, well nourished, no acute distress.  HEENT: normocephalic/atraumatic  EYES:PERRLA/EOMI, no redness, swelling or drainage  EARS: TMs: Gray, normal light reflex   NOSE:  no nasal discharge.    MOUTH: Oral mucosa: no erythema/exudate  Neck: No LAD/masses/thyromegaly/bruits  Lungs: clear bilaterally  Heart: regular rate and rhythm, no murmurs/gallops/rubs  Breasts: symmetric, no masses/skin changes, nipple discharge, or axillary LAD.  BSE reviewed.  Abdomen: Normal bowel sounds, soft, " non-tender, non-distended, no masses, neg Reynolds's/McBurney's, no rebound/guarding  Genital: Normal external genitalia, no discharge.  Internal exam is negative for masses, abnormal bleeding or discharge.  Rectal: internal exam is deferred.  External exam is normal.  Lymphatics: no supraclavicular/axillary/epitrochlear/inguinal LAD. No edema.  Neuro: A&O x 3, CN II-XII intact, strength 5/5, reflexes symmetric, sensory intact to light touch.  Psych: Behavior appropriate, engaging.  Thought processes congruent, non-tangential.  Musculoskeletal: no gross deformities.  Skin: no rashes or lesions, warm and dry      No results found for this or any previous visit (from the past 240 hour(s)).    ASSESSMENT/PLAN: 33 y.o. female physical exam and pelvic exam      The following high BMI interventions were performed this visit: encouragement to exercise, weight monitoring, exercise promotion: strength training and exercise promotion: stretching    1. Annual physical exam    - Thyroid Stimulating Hormone (TSH)  - Basic Metabolic Panel  - Hemoglobin  - Vitamin D, Total (25-Hydroxy)  - Lipid Cascade RANDOM      There are no discontinued medications.    Routine health maintenance discussion:  No smoking, limited alcohol (7 or less servings per week), 5 fruits/veg servings per day, 200 minutes of exercise per week.  Daily calcium/vitamin D guidelines, bone health, yearly mammogram after age 39/regular pap smears/colon cancer screening beginning at age 50.  Accident avoidance, sun screen.      Will contact her with the results of the labs when available.    Sandie Ireland , CNP

## 2021-06-26 ENCOUNTER — HEALTH MAINTENANCE LETTER (OUTPATIENT)
Age: 36
End: 2021-06-26

## 2021-07-23 ENCOUNTER — TRANSFERRED RECORDS (OUTPATIENT)
Dept: HEALTH INFORMATION MANAGEMENT | Facility: CLINIC | Age: 36
End: 2021-07-23

## 2021-08-06 ENCOUNTER — OFFICE VISIT (OUTPATIENT)
Dept: FAMILY MEDICINE | Facility: CLINIC | Age: 36
End: 2021-08-06
Payer: COMMERCIAL

## 2021-08-06 VITALS
BODY MASS INDEX: 32.39 KG/M2 | OXYGEN SATURATION: 98 % | WEIGHT: 176 LBS | HEART RATE: 77 BPM | HEIGHT: 62 IN | SYSTOLIC BLOOD PRESSURE: 118 MMHG | DIASTOLIC BLOOD PRESSURE: 88 MMHG

## 2021-08-06 DIAGNOSIS — M43.6 NECK STIFFNESS: ICD-10-CM

## 2021-08-06 DIAGNOSIS — Z11.59 NEED FOR HEPATITIS C SCREENING TEST: ICD-10-CM
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
## 2021-08-06 DIAGNOSIS — M25.512 ACUTE PAIN OF LEFT SHOULDER: ICD-10-CM

## 2021-08-06 PROCEDURE — 99214 OFFICE O/P EST MOD 30 MIN: CPT | Performed by: NURSE PRACTITIONER

## 2021-08-06 RX ORDER — CYCLOBENZAPRINE HCL 10 MG
5-10 TABLET ORAL 3 TIMES DAILY PRN
Qty: 5 TABLET | Refills: 0 | Status: SHIPPED | OUTPATIENT
Start: 2021-08-06 | End: 2021-10-22

## 2021-08-06 ASSESSMENT — MIFFLIN-ST. JEOR: SCORE: 1441.58
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
## 2021-08-06 NOTE — PATIENT INSTRUCTIONS
I did send a prescription of the muscle relaxer cyclobenzaprine (Flexeril) to your pharmacy to use as needed.  You can start with a half tab to see how that goes.  No alcohol or driving with this.    Since physical therapy is scheduling a ways out last I heard, I did just place the referral.  If showing significant improvement by the time you meet them, you can go ahead and cancel.    Work restrictions letter provided today.  If this needs to be addended, please let me know.    Make sure you do those passive range of motion exercises to prevent the shoulder from locking up.    We will try to get records from your emergency room visit.
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
## 2021-09-08 ENCOUNTER — HOSPITAL ENCOUNTER (OUTPATIENT)
Dept: PHYSICAL THERAPY | Facility: REHABILITATION | Age: 36
End: 2021-09-08
Payer: COMMERCIAL

## 2021-09-08 DIAGNOSIS — M79.18 MYOFASCIAL PAIN: ICD-10-CM

## 2021-09-08 DIAGNOSIS — M25.512 ACUTE PAIN OF LEFT SHOULDER: ICD-10-CM
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
## 2021-09-08 DIAGNOSIS — M77.8 LEFT SHOULDER TENDONITIS: ICD-10-CM

## 2021-09-08 DIAGNOSIS — M54.2 NECK PAIN: Primary | ICD-10-CM

## 2021-09-08 DIAGNOSIS — M43.6 NECK STIFFNESS: ICD-10-CM

## 2021-09-08 PROCEDURE — 97161 PT EVAL LOW COMPLEX 20 MIN: CPT | Mod: GP | Performed by: PHYSICAL THERAPIST

## 2021-09-08 PROCEDURE — 97110 THERAPEUTIC EXERCISES: CPT | Mod: GP | Performed by: PHYSICAL THERAPIST
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
## 2021-09-22 ENCOUNTER — HOSPITAL ENCOUNTER (OUTPATIENT)
Dept: PHYSICAL THERAPY | Facility: REHABILITATION | Age: 36
End: 2021-09-22
Payer: COMMERCIAL

## 2021-09-22 DIAGNOSIS — M54.2 NECK PAIN: ICD-10-CM
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
## 2021-09-22 DIAGNOSIS — M43.6 NECK STIFFNESS: Primary | ICD-10-CM

## 2021-09-22 DIAGNOSIS — M25.512 ACUTE PAIN OF LEFT SHOULDER: ICD-10-CM

## 2021-09-22 PROCEDURE — 97112 NEUROMUSCULAR REEDUCATION: CPT | Mod: GP | Performed by: PHYSICAL THERAPIST

## 2021-09-22 PROCEDURE — 97110 THERAPEUTIC EXERCISES: CPT | Mod: GP | Performed by: PHYSICAL THERAPIST
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
## 2021-10-01 ENCOUNTER — HOSPITAL ENCOUNTER (OUTPATIENT)
Dept: PHYSICAL THERAPY | Facility: REHABILITATION | Age: 36
End: 2021-10-01
Payer: COMMERCIAL

## 2021-10-01 DIAGNOSIS — M25.512 ACUTE PAIN OF LEFT SHOULDER: ICD-10-CM

## 2021-10-01 DIAGNOSIS — M43.6 NECK STIFFNESS: Primary | ICD-10-CM

## 2021-10-01 DIAGNOSIS — M54.2 NECK PAIN: ICD-10-CM
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
## 2021-10-01 PROCEDURE — 97140 MANUAL THERAPY 1/> REGIONS: CPT | Mod: GP | Performed by: PHYSICAL THERAPIST

## 2021-10-12 ENCOUNTER — HOSPITAL ENCOUNTER (OUTPATIENT)
Dept: PHYSICAL THERAPY | Facility: REHABILITATION | Age: 36
End: 2021-10-12
Payer: COMMERCIAL

## 2021-10-12 DIAGNOSIS — M54.2 NECK PAIN: ICD-10-CM

## 2021-10-12 DIAGNOSIS — M79.18 MYOFASCIAL PAIN: ICD-10-CM

## 2021-10-12 DIAGNOSIS — M43.6 NECK STIFFNESS: Primary | ICD-10-CM
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
## 2021-10-12 DIAGNOSIS — M25.512 ACUTE PAIN OF LEFT SHOULDER: ICD-10-CM

## 2021-10-12 DIAGNOSIS — M77.8 LEFT SHOULDER TENDONITIS: ICD-10-CM

## 2021-10-12 PROCEDURE — 97110 THERAPEUTIC EXERCISES: CPT | Mod: GP | Performed by: PHYSICAL THERAPIST

## 2021-10-12 PROCEDURE — 97140 MANUAL THERAPY 1/> REGIONS: CPT | Mod: GP | Performed by: PHYSICAL THERAPIST
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
## 2021-10-16 ENCOUNTER — HEALTH MAINTENANCE LETTER (OUTPATIENT)
Age: 36
End: 2021-10-16

## 2021-10-22 ENCOUNTER — E-VISIT (OUTPATIENT)
Dept: FAMILY MEDICINE | Facility: CLINIC | Age: 36
End: 2021-10-22
Payer: COMMERCIAL

## 2021-10-22 DIAGNOSIS — B00.1 COLD SORE: Primary | ICD-10-CM

## 2021-10-22 PROCEDURE — 99421 OL DIG E/M SVC 5-10 MIN: CPT | Performed by: NURSE PRACTITIONER

## 2021-10-22 RX ORDER — VALACYCLOVIR HYDROCHLORIDE 1 G/1
2000 TABLET, FILM COATED ORAL 2 TIMES DAILY
Qty: 4 TABLET | Refills: 1 | Status: SHIPPED | OUTPATIENT
Start: 2021-10-22

## 2021-10-22 NOTE — TELEPHONE ENCOUNTER
Provider E-Visit time total (minutes): less than 10 minutes    I sent you a Rx for your cold sores. Make sure to take the medication at the first sign of the outbreak and continue with daily Lysine.

## 2021-10-27 ENCOUNTER — HOSPITAL ENCOUNTER (OUTPATIENT)
Dept: PHYSICAL THERAPY | Facility: REHABILITATION | Age: 36
End: 2021-10-27
Payer: COMMERCIAL

## 2021-10-27 DIAGNOSIS — M25.512 ACUTE PAIN OF LEFT SHOULDER: ICD-10-CM

## 2021-10-27 DIAGNOSIS — M54.2 NECK PAIN: ICD-10-CM

## 2021-10-27 DIAGNOSIS — M79.18 MYOFASCIAL PAIN: ICD-10-CM

## 2021-10-27 DIAGNOSIS — M77.8 LEFT SHOULDER TENDONITIS: ICD-10-CM
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
## 2021-10-27 DIAGNOSIS — M43.6 NECK STIFFNESS: Primary | ICD-10-CM

## 2021-10-27 PROCEDURE — 97110 THERAPEUTIC EXERCISES: CPT | Mod: GP | Performed by: PHYSICAL THERAPIST

## 2021-10-27 PROCEDURE — 97140 MANUAL THERAPY 1/> REGIONS: CPT | Mod: GP | Performed by: PHYSICAL THERAPIST
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
## 2021-11-10 ENCOUNTER — HOSPITAL ENCOUNTER (OUTPATIENT)
Dept: PHYSICAL THERAPY | Facility: REHABILITATION | Age: 36
End: 2021-11-10
Payer: COMMERCIAL

## 2021-11-10 DIAGNOSIS — M25.512 ACUTE PAIN OF LEFT SHOULDER: ICD-10-CM
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
## 2021-11-10 DIAGNOSIS — M43.6 NECK STIFFNESS: Primary | ICD-10-CM

## 2021-11-10 PROCEDURE — 97140 MANUAL THERAPY 1/> REGIONS: CPT | Mod: GP | Performed by: PHYSICAL THERAPIST
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
## 2021-11-17 ENCOUNTER — E-VISIT (OUTPATIENT)
Dept: FAMILY MEDICINE | Facility: CLINIC | Age: 36
End: 2021-11-17
Payer: COMMERCIAL

## 2021-11-17 DIAGNOSIS — B00.1 COLD SORE: Primary | ICD-10-CM

## 2021-11-17 PROCEDURE — 99421 OL DIG E/M SVC 5-10 MIN: CPT | Performed by: NURSE PRACTITIONER

## 2021-11-18 RX ORDER — VALACYCLOVIR HYDROCHLORIDE 1 G/1
2000 TABLET, FILM COATED ORAL 2 TIMES DAILY
Qty: 4 TABLET | Refills: 0 | Status: SHIPPED | OUTPATIENT
Start: 2021-11-18 | End: 2021-11-19

## 2021-12-07 NOTE — ADDENDUM NOTE
Encounter addended by: Tracy Tracey, PT on: 12/7/2021 9:26 AM   Actions taken: Episode resolved, Clinical Note Signed

## 2021-12-07 NOTE — PROGRESS NOTES
[facility] Discharge Summary  Patient Name: Brooklyn Izquierdo  Date: 12/7/2021  Referral Diagnosis: neck pain    Patient was seen for 6 visits for physical therapy of neck pain from 9/8/21 to 11/10/21 with one canceled follow up appointments.     Reason for Discharge: Patient has met all goals.  Patient chooses to discontinue therapy.    Discharge Plan: Patient to continue home program.    Therapy will be discontinued at this time.  The patient will need a new referral to resume physical therapy treatment. Please see below for patient's current status.          Thank you for your referral.  ABHI RYAN, PT, DPT  12/7/2021  9:24 AM

## 2022-02-08 ENCOUNTER — LAB (OUTPATIENT)
Dept: FAMILY MEDICINE | Facility: CLINIC | Age: 37
End: 2022-02-08
Attending: FAMILY MEDICINE
Payer: COMMERCIAL

## 2022-02-08 DIAGNOSIS — Z20.822 SUSPECTED 2019 NOVEL CORONAVIRUS INFECTION: ICD-10-CM

## 2022-02-08 LAB — SARS-COV-2 RNA RESP QL NAA+PROBE: NEGATIVE

## 2022-02-08 PROCEDURE — U0003 INFECTIOUS AGENT DETECTION BY NUCLEIC ACID (DNA OR RNA); SEVERE ACUTE RESPIRATORY SYNDROME CORONAVIRUS 2 (SARS-COV-2) (CORONAVIRUS DISEASE [COVID-19]), AMPLIFIED PROBE TECHNIQUE, MAKING USE OF HIGH THROUGHPUT TECHNOLOGIES AS DESCRIBED BY CMS-2020-01-R: HCPCS

## 2022-02-08 PROCEDURE — 99207 PR NO CHARGE LOS: CPT

## 2022-02-08 PROCEDURE — U0005 INFEC AGEN DETEC AMPLI PROBE: HCPCS

## 2022-02-10 ENCOUNTER — OFFICE VISIT (OUTPATIENT)
Dept: FAMILY MEDICINE | Facility: CLINIC | Age: 37
End: 2022-02-10
Payer: COMMERCIAL

## 2022-02-10 VITALS
DIASTOLIC BLOOD PRESSURE: 68 MMHG | OXYGEN SATURATION: 99 % | HEART RATE: 98 BPM | WEIGHT: 179.7 LBS | BODY MASS INDEX: 32.87 KG/M2 | SYSTOLIC BLOOD PRESSURE: 114 MMHG | TEMPERATURE: 97.9 F

## 2022-02-10 DIAGNOSIS — J02.9 SORE THROAT: Primary | ICD-10-CM

## 2022-02-10 LAB
DEPRECATED S PYO AG THROAT QL EIA: NEGATIVE
GROUP A STREP BY PCR: NOT DETECTED

## 2022-02-10 PROCEDURE — 99213 OFFICE O/P EST LOW 20 MIN: CPT | Performed by: NURSE PRACTITIONER

## 2022-02-10 PROCEDURE — 87651 STREP A DNA AMP PROBE: CPT | Performed by: NURSE PRACTITIONER

## 2022-02-10 RX ORDER — AZITHROMYCIN 250 MG/1
TABLET, FILM COATED ORAL
Qty: 6 TABLET | Refills: 0 | Status: SHIPPED | OUTPATIENT
Start: 2022-02-10 | End: 2022-02-15

## 2022-02-10 NOTE — PROGRESS NOTES
Chief Complaint   Patient presents with     Health Maintenance     2 negative covid tests. sore throat, coughing, mucous, SOB, ear pressure, hot sweats, pt denies fever. x 5 days.        HPI: Patient presents today with several days of illness including sore throat, postnasal drip, nausea, and feeling feverish/cold. No temperature noticed on home thermometer. Covid testing negative. Mucus was clear but is turning green. No wheezing. No eye pain/discharge. No wheezing or hemoptysis.    ROS:Review of Systems - negative except for what's listed in the HPI    SH: The Patient's  reports that she has never smoked. She has never used smokeless tobacco. She reports that she does not drink alcohol and does not use drugs.      FH: The Patient's family history includes Cancer in her mother; Diabetes in her mother; No Known Problems in her brother, brother, brother, brother, brother, brother, brother, father, sister, sister, sister, sister, and sister.     Meds:    Current Outpatient Medications   Medication     azithromycin (ZITHROMAX) 250 MG tablet     cholecalciferol, vitamin D3, (VITAMIN D3 ORAL)     multivit-minerals/folic acid (WOMENS DAILY GUMMIES ORAL)     valACYclovir (VALTREX) 1000 mg tablet     valACYclovir (VALTREX) 1000 mg tablet     No current facility-administered medications for this visit.       O:  /68   Pulse 98   Temp 97.9  F (36.6  C) (Oral)   Wt 81.5 kg (179 lb 11.2 oz)   LMP 02/08/2022 (Exact Date)   SpO2 99%   Breastfeeding No   BMI 32.87 kg/m      Physical Examination:   General appearance - alert, well appearing, and in no distress  Mental status - alert, oriented to person, place, and time  Eyes -PERRLA  Ears - bilateral TM's and external ear canals normal  Nose -swollen nasal mucosa. Scant drainage.  Mouth -erythematous pharynx. No exudate.  Lymphatics -mild submandibular lymphadenopathy  Chest - clear to auscultation, no wheezes, rales or rhonchi, symmetric air entry  Heart - normal  rate and regular rhythm, S1 and S2 normal, no murmurs noted      A/P:       ICD-10-CM    1. Sore throat  J02.9 Streptococcus A Rapid Scr w Reflx to PCR - Lab Collect     Streptococcus A Rapid Scr w Reflx to PCR - Lab Collect     azithromycin (ZITHROMAX) 250 MG tablet     Group A Streptococcus PCR Throat Swab     Rapid strep negative. Await culture. Discussed over-the-counter symptom management options. Azithromycin to hold onto. If no improvement over several days, go ahead and start.    Sore throat  - Streptococcus A Rapid Scr w Reflx to PCR - Lab Collect  - Streptococcus A Rapid Scr w Reflx to PCR - Lab Collect  - azithromycin (ZITHROMAX) 250 MG tablet  Dispense: 6 tablet; Refill: 0  - Group A Streptococcus PCR Throat Swab        Clifford Lara, CNP      This note has been dictated using voice recognition software. Any grammatical or context distortions are unintentional and inherent to the software.

## 2022-02-10 NOTE — PATIENT INSTRUCTIONS
Rapid strep is negative.  Culture will come back tomorrow    Try that NyQuil 30 minutes before bed to see if that can help you get more restful sleep.    I sent in a medicine to your pharmacy called azithromycin.  Give this a few more days to see if you start turning the corner.  If you do, then you do not need to take this antibiotic.  If things continue to worsen or fail to improve, go ahead and give it a start.

## 2022-07-23 ENCOUNTER — HEALTH MAINTENANCE LETTER (OUTPATIENT)
Age: 37
End: 2022-07-23

## 2022-10-01 ENCOUNTER — HEALTH MAINTENANCE LETTER (OUTPATIENT)
Age: 37
End: 2022-10-01

## 2022-12-29 ENCOUNTER — E-VISIT (OUTPATIENT)
Dept: URGENT CARE | Facility: CLINIC | Age: 37
End: 2022-12-29
Payer: COMMERCIAL

## 2022-12-29 DIAGNOSIS — B00.1 HERPES LABIALIS: Primary | ICD-10-CM

## 2022-12-29 PROCEDURE — 99421 OL DIG E/M SVC 5-10 MIN: CPT | Performed by: NURSE PRACTITIONER

## 2022-12-29 RX ORDER — VALACYCLOVIR HYDROCHLORIDE 1 G/1
2000 TABLET, FILM COATED ORAL 2 TIMES DAILY
Qty: 4 TABLET | Refills: 1 | Status: SHIPPED | OUTPATIENT
Start: 2022-12-29 | End: 2022-12-30

## 2023-02-20 ENCOUNTER — E-VISIT (OUTPATIENT)
Dept: URGENT CARE | Facility: CLINIC | Age: 38
End: 2023-02-20
Payer: COMMERCIAL

## 2023-02-20 DIAGNOSIS — B00.1 HERPES LABIALIS: Primary | ICD-10-CM

## 2023-02-20 PROCEDURE — 99421 OL DIG E/M SVC 5-10 MIN: CPT | Performed by: PHYSICIAN ASSISTANT

## 2023-02-20 RX ORDER — ACYCLOVIR 400 MG/1
400 TABLET ORAL 3 TIMES DAILY
Qty: 15 TABLET | Refills: 1 | Status: SHIPPED | OUTPATIENT
Start: 2023-02-20 | End: 2023-02-25

## 2023-08-06 ENCOUNTER — HEALTH MAINTENANCE LETTER (OUTPATIENT)
Age: 38
End: 2023-08-06

## 2024-09-29 ENCOUNTER — HEALTH MAINTENANCE LETTER (OUTPATIENT)
Age: 39
End: 2024-09-29

## 2025-06-29 ENCOUNTER — HEALTH MAINTENANCE LETTER (OUTPATIENT)
Age: 40
End: 2025-06-29